# Patient Record
Sex: FEMALE | Race: WHITE | NOT HISPANIC OR LATINO | URBAN - METROPOLITAN AREA
[De-identification: names, ages, dates, MRNs, and addresses within clinical notes are randomized per-mention and may not be internally consistent; named-entity substitution may affect disease eponyms.]

---

## 2023-11-07 PROBLEM — R87.612 LOW GRADE SQUAMOUS INTRAEPITHELIAL LESION (LGSIL) ON PAPANICOLAOU SMEAR OF CERVIX: Status: ACTIVE | Noted: 2023-11-07

## 2023-11-07 PROBLEM — Z86.19 H/O COLD SORES: Status: ACTIVE | Noted: 2023-11-07

## 2023-11-07 PROBLEM — N97.0 ANOVULATORY BLEEDING: Status: ACTIVE | Noted: 2023-11-07

## 2023-11-08 ENCOUNTER — OFFICE VISIT (OUTPATIENT)
Dept: OBSTETRICS AND GYNECOLOGY | Facility: CLINIC | Age: 48
End: 2023-11-08
Payer: COMMERCIAL

## 2023-11-08 VITALS
WEIGHT: 177 LBS | HEIGHT: 63 IN | BODY MASS INDEX: 31.36 KG/M2 | SYSTOLIC BLOOD PRESSURE: 112 MMHG | DIASTOLIC BLOOD PRESSURE: 64 MMHG

## 2023-11-08 DIAGNOSIS — Z12.31 BREAST CANCER SCREENING BY MAMMOGRAM: ICD-10-CM

## 2023-11-08 DIAGNOSIS — Z01.419 WELL WOMAN EXAM WITH ROUTINE GYNECOLOGICAL EXAM: ICD-10-CM

## 2023-11-08 PROCEDURE — 4004F PT TOBACCO SCREEN RCVD TLK: CPT | Performed by: OBSTETRICS & GYNECOLOGY

## 2023-11-08 PROCEDURE — 88175 CYTOPATH C/V AUTO FLUID REDO: CPT

## 2023-11-08 PROCEDURE — 99396 PREV VISIT EST AGE 40-64: CPT | Performed by: OBSTETRICS & GYNECOLOGY

## 2023-11-08 RX ORDER — VALACYCLOVIR HYDROCHLORIDE 500 MG/1
TABLET, FILM COATED ORAL
COMMUNITY

## 2023-11-08 NOTE — PROGRESS NOTES
Subjective   Patient ID: Fara Quiñones is a 48 y.o. female who presents for Well Women Visit (Annual exam with pap smear and mammogram order//No complaints//Chaperone declined).  HPI  As contained in the chief complaint  Review of Systems  10 symptoms have been reviewed and are negative and noncontributory to the patient current ailments.    Objective   Physical Exam  Vital signs reviewed    CONSTITUTIONAL- well nourished, well developed, looks like stated age.  She is sitting comfortably on the exam table in no apparent distress  SKIN- normal skin color and pigmentation no visible lesions  EYES- normal external exam  THYROID- symmetrical ,normal size and normal consistency  HEART- RRR without murmur, S3 or S4.  LUNGS- breathing comfortably, no dyspnea  EXTREMITIES- no deformities  NEUROLOGICAL- oriented and no focal signs.  Cranial nerves II through XII intact  PSYCHIATRIC- alert, pleasant and cordial, age-appropriate, not anxious, or depressed appearing  BREASTS-normal appearance, no skin changes or nipple discharge.  Palpation of the breast and axillae: No palpable mass and no axillary lymphadenopathy  ABDOMEN- soft, non distended, bowel sound normal pitch and intensity,no palpable abnormal masses  GENITOURINARY- External genitalia: Normal.  Chronic left Bartholin cyst                               - Uterus: AV/AF NSSC, mobile and nontender                                -The adnexal areas are free of tenderness or mass                                -There are no cervical lesions; there is no cervical motion tenderness                                -Vagina without lesions, mucosa pink and well-hydrated, discharge is physiologic.                                   Inspection of Perianal Area: Normal    Assessment/Plan   Diagnoses and all orders for this visit:  Well woman exam with routine gynecological exam  -     THINPREP PAP TEST  Breast cancer screening by mammogram  -     BI mammo bilateral screening  tomosynthesis; Future

## 2023-11-24 LAB
CYTOLOGY CMNT CVX/VAG CYTO-IMP: NORMAL
LAB AP HPV GENOTYPE QUESTION: YES
LAB AP HPV HR: NORMAL
LABORATORY COMMENT REPORT: NORMAL
LMP START DATE: NORMAL
PATH REPORT.TOTAL CANCER: NORMAL